# Patient Record
Sex: MALE | Race: WHITE | NOT HISPANIC OR LATINO | Employment: FULL TIME | URBAN - METROPOLITAN AREA
[De-identification: names, ages, dates, MRNs, and addresses within clinical notes are randomized per-mention and may not be internally consistent; named-entity substitution may affect disease eponyms.]

---

## 2019-05-22 ENCOUNTER — TRANSCRIBE ORDERS (OUTPATIENT)
Dept: ADMINISTRATIVE | Facility: HOSPITAL | Age: 29
End: 2019-05-22

## 2019-05-22 DIAGNOSIS — M54.5 LOW BACK PAIN, UNSPECIFIED BACK PAIN LATERALITY, UNSPECIFIED CHRONICITY, WITH SCIATICA PRESENCE UNSPECIFIED: Primary | ICD-10-CM

## 2019-05-28 ENCOUNTER — APPOINTMENT (OUTPATIENT)
Dept: RADIOLOGY | Facility: CLINIC | Age: 29
End: 2019-05-28
Payer: COMMERCIAL

## 2019-05-28 ENCOUNTER — HOSPITAL ENCOUNTER (OUTPATIENT)
Dept: CT IMAGING | Facility: CLINIC | Age: 29
Discharge: HOME | End: 2019-05-28
Payer: COMMERCIAL

## 2019-05-28 DIAGNOSIS — M54.5 LOW BACK PAIN, UNSPECIFIED BACK PAIN LATERALITY, UNSPECIFIED CHRONICITY, WITH SCIATICA PRESENCE UNSPECIFIED: ICD-10-CM

## 2019-05-28 PROCEDURE — 72131 CT LUMBAR SPINE W/O DYE: CPT

## 2019-05-28 PROCEDURE — 72114 X-RAY EXAM L-S SPINE BENDING: CPT

## 2022-06-21 ENCOUNTER — APPOINTMENT (OUTPATIENT)
Dept: LAB | Facility: CLINIC | Age: 32
End: 2022-06-21
Payer: COMMERCIAL

## 2022-06-21 ENCOUNTER — OFFICE VISIT (OUTPATIENT)
Dept: INTERNAL MEDICINE CLINIC | Facility: CLINIC | Age: 32
End: 2022-06-21
Payer: COMMERCIAL

## 2022-06-21 VITALS
DIASTOLIC BLOOD PRESSURE: 70 MMHG | RESPIRATION RATE: 18 BRPM | TEMPERATURE: 97 F | HEART RATE: 91 BPM | OXYGEN SATURATION: 98 % | WEIGHT: 183.2 LBS | SYSTOLIC BLOOD PRESSURE: 112 MMHG

## 2022-06-21 DIAGNOSIS — Z00.00 ANNUAL PHYSICAL EXAM: ICD-10-CM

## 2022-06-21 DIAGNOSIS — Z11.4 SCREENING FOR HIV (HUMAN IMMUNODEFICIENCY VIRUS): ICD-10-CM

## 2022-06-21 DIAGNOSIS — R21 RASH AND NONSPECIFIC SKIN ERUPTION: ICD-10-CM

## 2022-06-21 DIAGNOSIS — Z11.59 NEED FOR HEPATITIS C SCREENING TEST: ICD-10-CM

## 2022-06-21 DIAGNOSIS — Z00.00 ANNUAL PHYSICAL EXAM: Primary | ICD-10-CM

## 2022-06-21 LAB
ALBUMIN SERPL BCP-MCNC: 4.3 G/DL (ref 3.5–5)
ALP SERPL-CCNC: 77 U/L (ref 46–116)
ALT SERPL W P-5'-P-CCNC: 38 U/L (ref 12–78)
ANION GAP SERPL CALCULATED.3IONS-SCNC: 3 MMOL/L (ref 4–13)
AST SERPL W P-5'-P-CCNC: 24 U/L (ref 5–45)
BILIRUB SERPL-MCNC: 0.37 MG/DL (ref 0.2–1)
BUN SERPL-MCNC: 17 MG/DL (ref 5–25)
CALCIUM SERPL-MCNC: 9.2 MG/DL (ref 8.3–10.1)
CHLORIDE SERPL-SCNC: 108 MMOL/L (ref 100–108)
CO2 SERPL-SCNC: 29 MMOL/L (ref 21–32)
CREAT SERPL-MCNC: 1.02 MG/DL (ref 0.6–1.3)
GFR SERPL CREATININE-BSD FRML MDRD: 98 ML/MIN/1.73SQ M
GLUCOSE P FAST SERPL-MCNC: 107 MG/DL (ref 65–99)
POTASSIUM SERPL-SCNC: 3.6 MMOL/L (ref 3.5–5.3)
PROT SERPL-MCNC: 7.9 G/DL (ref 6.4–8.2)
SODIUM SERPL-SCNC: 140 MMOL/L (ref 136–145)

## 2022-06-21 PROCEDURE — 87389 HIV-1 AG W/HIV-1&-2 AB AG IA: CPT

## 2022-06-21 PROCEDURE — 86803 HEPATITIS C AB TEST: CPT

## 2022-06-21 PROCEDURE — 99385 PREV VISIT NEW AGE 18-39: CPT | Performed by: FAMILY MEDICINE

## 2022-06-21 PROCEDURE — 4004F PT TOBACCO SCREEN RCVD TLK: CPT | Performed by: FAMILY MEDICINE

## 2022-06-21 PROCEDURE — 80053 COMPREHEN METABOLIC PANEL: CPT

## 2022-06-21 PROCEDURE — 99406 BEHAV CHNG SMOKING 3-10 MIN: CPT | Performed by: FAMILY MEDICINE

## 2022-06-21 PROCEDURE — 36415 COLL VENOUS BLD VENIPUNCTURE: CPT

## 2022-06-21 PROCEDURE — 3725F SCREEN DEPRESSION PERFORMED: CPT | Performed by: FAMILY MEDICINE

## 2022-06-21 RX ORDER — KETOCONAZOLE 20 MG/G
CREAM TOPICAL DAILY
Qty: 30 G | Refills: 0 | Status: SHIPPED | OUTPATIENT
Start: 2022-06-21

## 2022-06-21 NOTE — PROGRESS NOTES
ADULT ANNUAL PHYSICAL  [address]    NAME: Janina Tang  AGE: 29 y o  SEX: male  : 1990     DATE: 2022     Assessment and Plan:     Problem List Items Addressed This Visit    None     Visit Diagnoses     Annual physical exam    -  Primary    Relevant Orders    Comprehensive metabolic panel    Rash and nonspecific skin eruption        Relevant Medications    ketoconazole (NIZORAL) 2 % cream    Need for hepatitis C screening test        Relevant Orders    Hepatitis C Antibody ([facility])    Screening for HIV (human immunodeficiency virus)        Relevant Orders    HIV 1/2 Antigen/Antibody (4th Generation) w Reflex [facility]        Probable tinea  Antifungal provided  Screening studies ordered  Immunizations and preventive care screenings were discussed with patient today  Appropriate education was printed on patient's after visit summary  Counseling:  · Dental Health: discussed importance of regular tooth brushing, flossing, and dental visits  Depression Screening and Follow-up Plan: Patient was screened for depression during today's encounter  They screened negative with a PHQ-2 score of 0  Tobacco Cessation Counseling: Tobacco cessation counseling was provided  The patient is sincerely urged to quit consumption of tobacco  He is ready to quit tobacco        Return in about 1 year (around 2023) for Annual physical      Chief Complaint:     Chief Complaint   Patient presents with    Physical Exam     Pt states that he thinks that he has a covid non itchy rash on his chest and back he did had covid 3 weeks ago       History of Present Illness:     Adult Annual Physical   Patient here for a comprehensive physical exam    Rash since covid 2 years ago on anterior chest wall  Not itchy  It seemed to get bigger after recent re- infection of covid 3 weeks ago  Similar rash on his low back as well   Tried apple cider vinegar and himalyan salt on the lesions without improvement  [place] and it seemed to get lighter after being outside  It fluctuates in intentisty of redness  Reports hx of kidney stones as a child  Family hx of afib(brother)  Denies family hx of colon or prostatn cancer   Current daily smoker-hx of vape  since 17year old  Hx of tobacco dip use as a teen  Diet and Physical Activity  · Diet/Nutrition: limited fruits/vegetables  · Exercise: 5-7 times a week on average  Depression Screening  PHQ-2/9 Depression Screening    Little interest or pleasure in doing things: 0 - not at all  Feeling down, depressed, or hopeless: 0 - not at all  PHQ-2 Score: 0  PHQ-2 Interpretation: Negative depression screen       General Health  · Sleep: gets 7-8 hours of sleep on average  · Hearing: normal - bilateral   · Vision: most recent eye exam >1 year ago and wears glasses  · Dental: regular dental visits   Health   · Sexual active female partner only    · History of STDs?: no   · hiv screen-years ago  Review of Systems:     Review of Systems   Constitutional: Negative for fatigue and fever  Respiratory: Negative for cough and shortness of breath  Cardiovascular: Negative for chest pain  Gastrointestinal: Negative for blood in stool, constipation and diarrhea  Genitourinary: Negative for penile pain and penile swelling  Musculoskeletal: Positive for back pain  Negative for gait problem  Past Medical History:     History reviewed  No pertinent past medical history     Past Surgical History:     Past Surgical History:   Procedure Laterality Date    BACK SURGERY  04/2018    HAND SURGERY        Social History:     Social History     Socioeconomic History    Marital status: /Civil Union     Spouse name: None    Number of children: None    Years of education: None    Highest education level: None   Occupational History    None   Tobacco Use    Smoking status: Current Every Day Smoker     Packs/day: 0 50     Years: 15 00     Pack years: 7 50     Types: Cigarettes    Smokeless tobacco: Former User     Types: Chew, Snuff     Quit date: 3/1/2011   Substance and Sexual Activity    Alcohol use: Never    Drug use: Yes     Types: Other     Comment: medical marijuana ahs the licensed    Sexual activity: None   Other Topics Concern    None   Social History Narrative    None     Social Determinants of Health     Financial Resource Strain: Not on file   Food Insecurity: Not on file   Transportation Needs: Not on file   Physical Activity: Not on file   Stress: Not on file   Social Connections: Not on file   Intimate Partner Violence: Not on file   Housing Stability: Not on file      Family History:     History reviewed  No pertinent family history  Current Medications:     Current Outpatient Medications   Medication Sig Dispense Refill    ketoconazole (NIZORAL) 2 % cream Apply topically daily 30 g 0     No current facility-administered medications for this visit  Allergies:     No Known Allergies   Physical Exam:     /70 (BP Location: Left arm, Patient Position: Sitting, Cuff Size: Standard)   Pulse 91   Temp (!) 97 °F (36 1 °C) (Temporal)   Resp 18   Wt 83 1 kg (183 lb 3 2 oz)   SpO2 98%     Physical Exam  Constitutional:       Appearance: He is not ill-appearing  HENT:      Head: Normocephalic and atraumatic  Right Ear: Tympanic membrane and external ear normal       Left Ear: Tympanic membrane and external ear normal    Cardiovascular:      Rate and Rhythm: Normal rate and regular rhythm  Heart sounds: No murmur heard  Pulmonary:      Effort: Pulmonary effort is normal       Breath sounds: Normal breath sounds  Abdominal:      General: Abdomen is flat  Bowel sounds are normal       Palpations: Abdomen is soft  Skin:     Findings: Rash present  Comments:  Well demarcated slightly pigmented macular lesions(close to flesh colored)  Neurological:      Mental Status: He is alert and oriented to person, place, and time        Gait: Gait normal               [address] [place], [place],    MEDICAL [address]

## 2022-06-21 NOTE — PATIENT INSTRUCTIONS

## 2022-06-22 LAB — HCV AB SER QL: NORMAL

## 2022-06-23 ENCOUNTER — TELEPHONE (OUTPATIENT)
Dept: INTERNAL MEDICINE CLINIC | Facility: CLINIC | Age: 32
End: 2022-06-23

## 2022-06-23 LAB — HIV 1+2 AB+HIV1 P24 AG SERPL QL IA: NORMAL

## 2022-06-23 NOTE — TELEPHONE ENCOUNTER
----- Message from Carlos Alberto Dent DO sent at 6/23/2022 12:44 PM EDT -----  Please notify pt that his HIV screen was negative

## 2022-06-23 NOTE — TELEPHONE ENCOUNTER
LVM on patient's cell phone  Re:  HIV results  Only lab result of "negative" given, no mention of type/name of test    Asked pt to return call w/questions

## 2022-06-23 NOTE — TELEPHONE ENCOUNTER
----- Message from Moises Gilbert DO sent at 6/23/2022  8:25 AM EDT -----  Please notify pt that overall his blood work looks ok  His HIV test is still pending

## 2023-07-17 ENCOUNTER — APPOINTMENT (EMERGENCY)
Dept: CT IMAGING | Facility: HOSPITAL | Age: 33
End: 2023-07-17
Payer: COMMERCIAL

## 2023-07-17 ENCOUNTER — HOSPITAL ENCOUNTER (EMERGENCY)
Facility: HOSPITAL | Age: 33
Discharge: HOME | End: 2023-07-17
Attending: EMERGENCY MEDICINE | Admitting: EMERGENCY MEDICINE
Payer: COMMERCIAL

## 2023-07-17 VITALS
DIASTOLIC BLOOD PRESSURE: 56 MMHG | RESPIRATION RATE: 16 BRPM | TEMPERATURE: 98 F | OXYGEN SATURATION: 97 % | SYSTOLIC BLOOD PRESSURE: 104 MMHG | HEART RATE: 54 BPM

## 2023-07-17 DIAGNOSIS — M51.16 LUMBAR DISC HERNIATION WITH RADICULOPATHY: Primary | ICD-10-CM

## 2023-07-17 DIAGNOSIS — M54.16 ACUTE RIGHT LUMBAR RADICULOPATHY: ICD-10-CM

## 2023-07-17 LAB
BACTERIA UR QL AUTO: ABNORMAL /HPF
BILIRUB UR QL STRIP: NEGATIVE
CLARITY UR: ABNORMAL
COLOR UR: YELLOW
GLUCOSE UR STRIP-MCNC: NEGATIVE MG/DL
HGB UR QL STRIP.AUTO: NEGATIVE
KETONES UR STRIP-MCNC: NEGATIVE MG/DL
LEUKOCYTE ESTERASE UR QL STRIP: NEGATIVE
MUCOUS THREADS UR QL AUTO: ABNORMAL
NITRITE UR QL STRIP: NEGATIVE
NON-SQ EPI CELLS URNS QL MICRO: ABNORMAL /HPF
PH UR STRIP.AUTO: 6 [PH]
PROT UR STRIP-MCNC: ABNORMAL MG/DL
RBC #/AREA URNS AUTO: ABNORMAL /HPF
SP GR UR STRIP.AUTO: 1.03 (ref 1–1.03)
UROBILINOGEN UR STRIP-ACNC: <2 MG/DL
WBC #/AREA URNS AUTO: ABNORMAL /HPF

## 2023-07-17 PROCEDURE — G1004 CDSM NDSC: HCPCS

## 2023-07-17 PROCEDURE — 72131 CT LUMBAR SPINE W/O DYE: CPT

## 2023-07-17 PROCEDURE — 81001 URINALYSIS AUTO W/SCOPE: CPT | Performed by: EMERGENCY MEDICINE

## 2023-07-17 PROCEDURE — 99284 EMERGENCY DEPT VISIT MOD MDM: CPT | Performed by: EMERGENCY MEDICINE

## 2023-07-17 PROCEDURE — 96372 THER/PROPH/DIAG INJ SC/IM: CPT

## 2023-07-17 PROCEDURE — 99283 EMERGENCY DEPT VISIT LOW MDM: CPT

## 2023-07-17 RX ORDER — LIDOCAINE 50 MG/G
1 PATCH TOPICAL ONCE
Status: DISCONTINUED | OUTPATIENT
Start: 2023-07-17 | End: 2023-07-17 | Stop reason: HOSPADM

## 2023-07-17 RX ORDER — METHYLPREDNISOLONE 4 MG/1
TABLET ORAL
Qty: 21 TABLET | Refills: 0 | Status: SHIPPED | OUTPATIENT
Start: 2023-07-17

## 2023-07-17 RX ORDER — ACETAMINOPHEN 325 MG/1
975 TABLET ORAL ONCE
Status: COMPLETED | OUTPATIENT
Start: 2023-07-17 | End: 2023-07-17

## 2023-07-17 RX ORDER — MORPHINE SULFATE 15 MG/1
15 TABLET ORAL EVERY 6 HOURS PRN
Qty: 10 TABLET | Refills: 0 | Status: SHIPPED | OUTPATIENT
Start: 2023-07-17

## 2023-07-17 RX ORDER — MORPHINE SULFATE 10 MG/ML
6 INJECTION, SOLUTION INTRAMUSCULAR; INTRAVENOUS ONCE
Status: COMPLETED | OUTPATIENT
Start: 2023-07-17 | End: 2023-07-17

## 2023-07-17 RX ORDER — CELECOXIB 200 MG/1
200 CAPSULE ORAL EVERY 12 HOURS
Qty: 60 CAPSULE | Refills: 0 | Status: SHIPPED | OUTPATIENT
Start: 2023-07-17

## 2023-07-17 RX ORDER — KETOROLAC TROMETHAMINE 30 MG/ML
30 INJECTION, SOLUTION INTRAMUSCULAR; INTRAVENOUS ONCE
Status: COMPLETED | OUTPATIENT
Start: 2023-07-17 | End: 2023-07-17

## 2023-07-17 RX ADMIN — MORPHINE SULFATE 6 MG: 10 INJECTION INTRAVENOUS at 08:25

## 2023-07-17 RX ADMIN — KETOROLAC TROMETHAMINE 30 MG: 30 INJECTION, SOLUTION INTRAMUSCULAR at 08:25

## 2023-07-17 RX ADMIN — LIDOCAINE 5% 1 PATCH: 700 PATCH TOPICAL at 08:25

## 2023-07-17 RX ADMIN — ACETAMINOPHEN 975 MG: 325 TABLET, FILM COATED ORAL at 08:25

## 2023-07-17 NOTE — ED PROVIDER NOTES
History  Chief Complaint   Patient presents with   • Back Pain     Severe back pain radiating down R leg, seen yesterday, had trouble urinating last night; hx of repaired herniated disc      Patient is a 21-year-old male with past medical and surgical history significant for prior lumbar disc herniation status post lumbar spine surgery in 2017 in [facility] presents to the emergency department for acute low back pain that started yesterday when he lifted his 60 pound dog. Patient states when he was standing up after lifting the dog he felt acute pain in his right low back which is now mostly in the right buttocks. The pain travels down the entire leg to the level of the foot. He denies any significant chronic back pain since his surgery. He reports he has had constant paresthesia of the right foot as well as subjective feeling of right leg weakness since the pain started. He went to [facility] yesterday who prescribed him Percocet, prednisone 60 mg daily for total of 5 days as well as Valium for muscle relaxer effects. Patient states he took 2 Percocet, 1 Valium as well as the prednisone earlier this morning without relief. He reports that laying on his stomach in a flat position is the only position of comfort. Patient also states that last night he was having difficulty urinating but is unsure if that is related to the position he needed to be in to urinate. This morning he was still able to urinate and denies any feeling of urinary retention and denies any urinary incontinence. He denies any other associated symptoms such as fevers or chills, headache, dizziness or near syncope, cough, URI symptoms, chest pain, shortness of breath, palpitations, abdominal pain, nausea, vomiting, change in bowel habits, dysuria, gross hematuria, flank pain, skin rash or color change, other focal neurologic deficits.       History provided by:  Patient   used: No    Back Pain  Associated symptoms: numbness and weakness    Associated symptoms: no abdominal pain, no chest pain, no dysuria, no fever and no headaches        Prior to Admission Medications   Prescriptions Last Dose Informant Patient Reported? Taking?   ketoconazole (NIZORAL) 2 % cream   No No   Sig: Apply topically daily      Facility-Administered Medications: None       No past medical history on file. Past Surgical History:   Procedure Laterality Date   • BACK SURGERY  04/2018   • HAND SURGERY         No family history on file. I have reviewed and agree with the history as documented. E-Cigarette/Vaping     E-Cigarette/Vaping Substances     Social History     Tobacco Use   • Smoking status: Every Day     Packs/day: 0.50     Years: 15.00     Total pack years: 7.50     Types: Cigarettes   • Smokeless tobacco: Former     Types: Chew, Snuff     Quit date: 3/1/2011   Substance Use Topics   • Alcohol use: Never   • Drug use: Yes     Types: Other     Comment: medical marijuana ahs the licensed       Review of Systems   Constitutional: Negative for chills and fever. HENT: Negative for congestion, ear pain, rhinorrhea and sore throat. Respiratory: Negative for cough, chest tightness, shortness of breath and wheezing. Cardiovascular: Negative for chest pain and palpitations. Gastrointestinal: Negative for abdominal pain, constipation, diarrhea, nausea and vomiting. Genitourinary: Positive for difficulty urinating. Negative for dysuria, flank pain, frequency and hematuria. Musculoskeletal: Positive for back pain. Negative for neck pain and neck stiffness. Skin: Negative for color change, pallor, rash and wound. Allergic/Immunologic: Negative for immunocompromised state. Neurological: Positive for weakness and numbness. Negative for dizziness, syncope, facial asymmetry, speech difficulty, light-headedness and headaches. Hematological: Negative for adenopathy. Does not bruise/bleed easily.    Psychiatric/Behavioral: Negative for confusion and decreased concentration. All other systems reviewed and are negative. Physical Exam  Physical Exam  Vitals and nursing note reviewed. Constitutional:       General: He is not in acute distress. Appearance: Normal appearance. He is well-developed. He is not ill-appearing, toxic-appearing or diaphoretic. HENT:      Head: Normocephalic and atraumatic. Right Ear: External ear normal.      Left Ear: External ear normal.      Nose: Nose normal.      Mouth/Throat:      Mouth: Mucous membranes are moist.      Pharynx: Oropharynx is clear. Eyes:      Extraocular Movements: Extraocular movements intact. Conjunctiva/sclera: Conjunctivae normal.   Neck:      Vascular: No JVD. Cardiovascular:      Rate and Rhythm: Normal rate and regular rhythm. Pulses: Normal pulses. Heart sounds: Normal heart sounds. No murmur heard. No friction rub. No gallop. Pulmonary:      Effort: Pulmonary effort is normal. No respiratory distress. Breath sounds: Normal breath sounds. No wheezing, rhonchi or rales. Abdominal:      General: There is no distension. Palpations: Abdomen is soft. Tenderness: There is no abdominal tenderness. There is no guarding or rebound. Musculoskeletal:         General: Tenderness present. No swelling or deformity. Cervical back: Normal range of motion and neck supple. No rigidity. Comments: Mild tenderness in the right gluteus muscle. No reproducible midline thoracolumbar spine tenderness. Skin:     General: Skin is warm and dry. Coloration: Skin is not pale. Findings: No erythema or rash. Neurological:      General: No focal deficit present. Mental Status: He is alert and oriented to person, place, and time. Sensory: No sensory deficit. Motor: Weakness present. Comments: 4/5 strength in bilateral lower extremities both with hip flexors as well as with dorsiflexion/plantarflexion.   No gross sensory deficits.    Psychiatric:         Mood and Affect: Mood normal.         Behavior: Behavior normal.         Vital Signs  ED Triage Vitals   Temperature Pulse Respirations Blood Pressure SpO2   07/17/23 0738 07/17/23 0738 07/17/23 0738 07/17/23 0738 07/17/23 0738   98 °F (36.7 °C) 64 16 130/64 98 %      Temp src Heart Rate Source Patient Position - Orthostatic VS BP Location FiO2 (%)   -- 07/17/23 1139 07/17/23 1139 07/17/23 1139 --    Monitor Lying Left arm       Pain Score       07/17/23 1142       2         Vitals:    07/17/23 0738 07/17/23 1139   BP: 130/64 104/56   BP Location:  Left arm   Pulse: 64 (!) 54   Resp: 16 16   Temp: 98 °F (36.7 °C)    SpO2: 98% 97%       Visual Acuity  Visual Acuity    Flowsheet Row Most Recent Value   L Pupil Size (mm) 3   R Pupil Size (mm) 3          ED Medications  Medications   lidocaine (LIDODERM) 5 % patch 1 patch (1 patch Topical Medication Applied 7/17/23 0825)   ketorolac (TORADOL) injection 30 mg (30 mg Intramuscular Given 7/17/23 0825)   acetaminophen (TYLENOL) tablet 975 mg (975 mg Oral Given 7/17/23 0825)   morphine injection 6 mg (6 mg Intramuscular Given 7/17/23 0825)       Diagnostic Studies  Results Reviewed     Procedure Component Value Units Date/Time    Urine Microscopic [[id number]]  (Abnormal) Collected: 07/17/23 1138    Lab Status: Final result Specimen: Urine, Other Updated: 07/17/23 1149     RBC, UA 1-2 /hpf      WBC, UA 1-2 /hpf      Epithelial Cells Occasional /hpf      Bacteria, UA Occasional /hpf      MUCUS THREADS Innumerable    UA (URINE) with reflex to Scope [[id number]]  (Abnormal) Collected: 07/17/23 1138    Lab Status: Final result Specimen: Urine, Other Updated: 07/17/23 1146     Color, UA Yellow     Clarity, UA Turbid     Specific Gravity, UA 1.026     pH, UA 6.0     Leukocytes, UA Negative     Nitrite, UA Negative     Protein, UA Trace mg/dl      Glucose, UA Negative mg/dl      Ketones, UA Negative mg/dl      Urobilinogen, UA <2.0 mg/dl Bilirubin, UA Negative     Occult Blood, UA Negative                 CT spine lumbar without contrast   Final Result by Patricia Patel MD (07/17 0842)      Interval development of a right paracentral disc extrusion at L4-L5 effacing the right lateral recess for the descending L5 nerve root. Findings are new since 5/28/2019. Workstation performed: [id number]                    Procedures  Procedures         ED Course  ED Course as of 07/17/23 1326   Mon Jul 17, 2023   1015 The patient and wife about significant CT findings of acute right paracentral disc herniation/extrusion at L4 and L5 which explains his right-sided radicular symptoms. Patient feeling somewhat better after the Toradol and morphine. Advised him that he will need to be able to ambulate and urinate prior to discharge. Patient is going to try to drink fluids and then attempt to urinate. I did offer an LSO brace but patient states he has a back brace at home from his prior lumbar surgery. 1207 Postvoid residual volume 11 cc per nursing. 56 Spoke with patient and wife and he was able to ambulate but only using crutches that he brought with him to the ED because it is far too painful to put any weight on the right leg. Will consult with neurosurgery on-call for further recommendations at this time. 300 [facility] on-call neurosurgery AP, Vidhya Hinojosa. 1300 Neurosurgery said patient can be discharged and does not need admission or transfer. He can follow-up with neurosurgery if no benefit from PT and medication in 1 month. 1316 Updated patient about neurosurgery recommendations. Wife was able to get patient an appointment with [facility] orthopedic surgery this Wednesday, 2 days from now. Discussed symptomatic management at home and I advised that he should not take any of the narcotic prescriptions at the same time as the Valium and to space these medications out.   Although he was just prescribed Percocet, he is not getting any relief from 10 mg of oxycodone so we will prescribe morphine as he got better relief from morphine and advised him to use this for breakthrough pain. We will also provide prescription for Medrol Dosepak to be started after the 5-day prednisone burst that was previously prescribed. We will also prescribe Celebrex to be used every 12 hours. Advised him to take these medications with food and not to drive after taking the Valium or the narcotic pain medication. Discussed ED return parameters including concerning signs or symptoms of spinal cord compression. SBIRT 20yo+    Flowsheet Row Most Recent Value   Initial Alcohol Screen: US AUDIT-C     1. How often do you have a drink containing alcohol? 0 Filed at: 07/17/2023 0739   2. How many drinks containing alcohol do you have on a typical day you are drinking? 0 Filed at: 07/17/2023 0739   3a. Male UNDER 65: How often do you have five or more drinks on one occasion? 0 Filed at: 07/17/2023 0739   3b. FEMALE Any Age, or MALE 65+: How often do you have 4 or more drinks on one occassion? 0 Filed at: 07/17/2023 0739   Audit-C Score 0 Filed at: 07/17/2023 7391   ESTRELLA: How many times in the past year have you. .. Used an illegal drug or used a prescription medication for non-medical reasons? Never Filed at: 07/17/2023 7072                    Medical Decision Making  22-year-old male presents to the ED for acute right lumbar radiculopathy that started yesterday when lifting his dog. Suspect acute disc herniation. Patient does have history of slipped disc with lumbar spine surgery in 2017. Very low suspicion for cauda equina syndrome. Will check urinalysis and postvoid residual volume given that he reports that he had difficulty urinating last night. Patient has mild weakness of both legs but this is likely due to pain and poor effort due to the pain.   He was seen at [facility] yesterday, refused imaging because he wanted an MRI which was not available. He was given prescription for Percocet, Valium, prednisone and took his doses today with minimal relief. Will provide IM Toradol, IM morphine and lidocaine patch. Obtain CT lumbar spine without contrast.  Patient is agreeable to this imaging. I explained to patient that we are unable to get MRI unless it is an emergent condition and pending CT results, no indication for stat MRI at this time. Will likely refer to comprehensive spine program and orthopedic surgery. Patient will need MRI in the near future. Amount and/or Complexity of Data Reviewed  External Data Reviewed:      Details: Reviewed recent ED records from [facility] on 7/16/2023  Labs: ordered. Decision-making details documented in ED Course. Radiology: ordered. Risk  OTC drugs. Prescription drug management. Disposition  Final diagnoses:   Lumbar disc herniation with radiculopathy   Acute right lumbar radiculopathy     Time reflects when diagnosis was documented in both MDM as applicable and the Disposition within this note     Time User Action Codes Description Comment    7/17/2023  1:20 PM Remigio VAZQUEZ Add [E01.44,  M51.26] Degeneration of lumbar intervertebral disc with acute herniation     7/17/2023  1:20 PM Remigio VAZQUEZ Remove [M51.36,  M51.26] Degeneration of lumbar intervertebral disc with acute herniation     7/17/2023  1:21 PM Remigio VAZQUEZ Add [M51.16] Lumbar disc herniation with radiculopathy     7/17/2023  1:21 PM Remigio VAZQUEZ Add [M54.16] Acute right lumbar radiculopathy       ED Disposition     ED Disposition   Discharge    Condition   Stable    Date/Time   Mon Jul 17, 2023  1:20 PM    Comment   Ashli Corcoran discharge to home/self care.                Follow-up Information     Follow up With Specialties Details Why Contact Info Additional Information    Rogers Alejandra,  Family Medicine Schedule an appointment as soon as possible for a visit [address] [facility] 1330 [facility] Orthopedic Surgery Schedule an appointment as soon as possible for a visit   [address] [phone] [facility] [place], [address] [place] [address] [address], [place], [place], [phone]    [address] Neurosurgery Schedule an appointment as soon as possible for a visit   [address] [phone]  1441 [facility], /[address], [place], [place], [address] [phone]    [facility] Emergency Department Emergency Medicine Go to  If symptoms worsen 100 [facility] Emergency Department, [place], [place], [address]          Patient's Medications   Discharge Prescriptions    CELECOXIB (CELEBREX) 200 MG CAPSULE    Take 1 capsule (200 mg total) by mouth every 12 (twelve) hours       Start Date: 7/17/2023 End Date: --       Order Dose: 200 mg       Quantity: 60 capsule    Refills: 0    METHYLPREDNISOLONE 4 MG TABLET THERAPY PACK    Use as directed on package       Start Date: 7/17/2023 End Date: --       Order Dose: --       Quantity: 21 tablet    Refills: 0    MORPHINE (MSIR) 15 MG TABLET    Take 1 tablet (15 mg total) by mouth every 6 (six) hours as needed for severe pain Max Daily Amount: 60 mg       Start Date: 7/17/2023 End Date: --       Order Dose: 15 mg       Quantity: 10 tablet    Refills: 0           PDMP Review     None          ED Provider  Electronically Signed by           Ary Blake DO  07/17/23 6199

## 2023-07-18 ENCOUNTER — TELEPHONE (OUTPATIENT)
Dept: PHYSICAL THERAPY | Facility: OTHER | Age: 33
End: 2023-07-18

## 2023-07-18 NOTE — TELEPHONE ENCOUNTER
Call placed to the patient per Comprehensive Spine Program referral.    Spoke with patient, explained csp and reason for the call. Patient states he has a referral for [facility], they called him yesterday for the triage and will call him back in a week to schedule an eval.    Patient states he would like to have the eval first with [facility] and then he will give us a call if they thing PT will be the right option for him at this time. Will close referral per protocol.

## 2023-08-01 ENCOUNTER — TELEPHONE (OUTPATIENT)
Dept: NEUROSURGERY | Facility: CLINIC | Age: 33
End: 2023-08-01

## 2023-08-01 NOTE — TELEPHONE ENCOUNTER
Patient canceled appt. He stated that he's not coming in for 2nd opinion and will f/u in [address] [place] [address] with his original surgeon.    Appt for 8/3/23 rimma/ Bernice Abbott is canceled

## 2023-08-02 ENCOUNTER — TELEPHONE (OUTPATIENT)
Age: 33
End: 2023-08-02

## 2023-08-15 ENCOUNTER — TELEPHONE (OUTPATIENT)
Age: 33
End: 2023-08-15

## 2023-08-15 NOTE — TELEPHONE ENCOUNTER
Patient was scheduled for a clearance appointment with [address] The MA called him back and put him in a room. She went to take his BP and he refused. The MA did not feel comfortable so called another MA in the room. He again refused telling the MA to just sign the paperwork. There were several non acceptable swear words attached. The MA left the room and came to tell me. I spoke to the patient and told him we have to do BP and exam him in order to clear the patient. He began to yell  and scream that we did not need to do anything to him just sign the paper. with again some not so nice words. I again explained we need to clear him for surgery and that we need to examine since we have not seen him since 2022 . He refused so I told him we would cancel the appointment and refund his money. He said fine and preceded to follow me to the reception area to get his co pay back . He continued the whole time swearing and screaming. The person that was with him tried to calm him down as well which did not work. We refunded his money and left him go. I called Ed the  at [address] [place] [address]  and explained how patient refused to get examined and we would not be clearing him. He said he would speak to Dr Charlene Malagon and patient . I explained we would give Jordon Salgado another chance if they could make him understand we need to do exam as it has been a year.

## 2024-12-13 ENCOUNTER — OFFICE VISIT (OUTPATIENT)
Age: 34
End: 2024-12-13
Payer: COMMERCIAL

## 2024-12-13 ENCOUNTER — HOSPITAL ENCOUNTER (EMERGENCY)
Facility: HOSPITAL | Age: 34
Discharge: HOME | End: 2024-12-13
Attending: EMERGENCY MEDICINE
Payer: COMMERCIAL

## 2024-12-13 VITALS
TEMPERATURE: 98 F | HEART RATE: 74 BPM | HEIGHT: 69 IN | WEIGHT: 184.08 LBS | BODY MASS INDEX: 27.27 KG/M2 | OXYGEN SATURATION: 100 % | DIASTOLIC BLOOD PRESSURE: 68 MMHG | RESPIRATION RATE: 18 BRPM | SYSTOLIC BLOOD PRESSURE: 130 MMHG

## 2024-12-13 VITALS
RESPIRATION RATE: 18 BRPM | OXYGEN SATURATION: 99 % | HEART RATE: 75 BPM | BODY MASS INDEX: 27.17 KG/M2 | SYSTOLIC BLOOD PRESSURE: 128 MMHG | TEMPERATURE: 98 F | WEIGHT: 184 LBS | DIASTOLIC BLOOD PRESSURE: 71 MMHG

## 2024-12-13 DIAGNOSIS — S05.91XA RIGHT EYE INJURY, INITIAL ENCOUNTER: Primary | ICD-10-CM

## 2024-12-13 DIAGNOSIS — T15.91XA FOREIGN BODY OF RIGHT EYE, INITIAL ENCOUNTER: Primary | ICD-10-CM

## 2024-12-13 PROCEDURE — G0383 LEV 4 HOSP TYPE B ED VISIT: HCPCS | Performed by: PHYSICIAN ASSISTANT

## 2024-12-13 PROCEDURE — 99284 EMERGENCY DEPT VISIT MOD MDM: CPT | Performed by: EMERGENCY MEDICINE

## 2024-12-13 PROCEDURE — 65205 REMOVE FOREIGN BODY FROM EYE: CPT | Performed by: PHYSICIAN ASSISTANT

## 2024-12-13 PROCEDURE — S9083 URGENT CARE CENTER GLOBAL: HCPCS | Performed by: PHYSICIAN ASSISTANT

## 2024-12-13 PROCEDURE — 99283 EMERGENCY DEPT VISIT LOW MDM: CPT

## 2024-12-13 RX ORDER — TETRACAINE HYDROCHLORIDE 5 MG/ML
1 SOLUTION OPHTHALMIC ONCE
Status: COMPLETED | OUTPATIENT
Start: 2024-12-13 | End: 2024-12-13

## 2024-12-13 RX ORDER — OFLOXACIN 3 MG/ML
1 SOLUTION/ DROPS OPHTHALMIC 4 TIMES DAILY
Qty: 5 ML | Refills: 0 | Status: SHIPPED | OUTPATIENT
Start: 2024-12-13

## 2024-12-13 RX ADMIN — FLUORESCEIN SODIUM 1 STRIP: 1 STRIP OPHTHALMIC at 17:42

## 2024-12-13 RX ADMIN — TETRACAINE HYDROCHLORIDE 1 DROP: 5 SOLUTION OPHTHALMIC at 17:41

## 2024-12-13 NOTE — PATIENT INSTRUCTIONS
Foreign body right eye  Ocuflox as directed  Referred to the emergency room for further evaluation  Follow up with PCP in 3-5 days.  Proceed to  ER if symptoms worsen.

## 2024-12-13 NOTE — PROGRESS NOTES
[facility]        NAME: Keith Andino is a 34 y.o. male  : 1990    MRN: [id number]  DATE: 2024  TIME: 4:09 PM    Assessment and Plan   Foreign body of right eye, initial encounter [T15.91XA]  1. Foreign body of right eye, initial encounter  ofloxacin (OCUFLOX) 0.3 % ophthalmic solution            Patient Instructions     Foreign body right eye  Ocuflox as directed  Referred to the emergency room for further evaluation  Follow up with PCP in 3-5 days.  Proceed to  ER if symptoms worsen.    Chief Complaint     Chief Complaint   Patient presents with    Eye Problem     Right eye with possible metal in eye. Denies pain. This am he was fixing the exhaust fan and a felt something go in eye and now see the piece in the eye.         History of Present Illness       34-year-old male who presents complaining of foreign body sensation to the right eye.  Patient states that he feels like metal 1 and while he was cleaning a fan.  Denies visual disturbances.    Eye Problem   Pertinent negatives include no eye discharge, eye redness, itching or photophobia.       Review of Systems   Review of Systems   Constitutional: Negative.    HENT: Negative.     Eyes:  Positive for pain. Negative for photophobia, discharge, redness, itching and visual disturbance.   Respiratory: Negative.  Negative for apnea, cough, choking, chest tightness, shortness of breath, wheezing and stridor.    Cardiovascular: Negative.  Negative for chest pain.         Current Medications       Current Outpatient Medications:     ofloxacin (OCUFLOX) 0.3 % ophthalmic solution, Administer 1 drop to the right eye 4 (four) times a day, Disp: 5 mL, Rfl: 0    celecoxib (CeleBREX) 200 mg capsule, Take 1 capsule (200 mg total) by mouth every 12 (twelve) hours (Patient not taking: Reported on 2024), Disp: 60 capsule, Rfl: 0    ketoconazole (NIZORAL) 2 % cream, Apply topically daily (Patient not taking: Reported on 2024), Disp: 30  g, Rfl: 0    methylPREDNISolone 4 MG tablet therapy pack, Use as directed on package (Patient not taking: Reported on 12/13/2024), Disp: 21 tablet, Rfl: 0    morphine (MSIR) 15 mg tablet, Take 1 tablet (15 mg total) by mouth every 6 (six) hours as needed for severe pain Max Daily Amount: 60 mg (Patient not taking: Reported on 12/13/2024), Disp: 10 tablet, Rfl: 0    pregabalin (LYRICA) 75 mg capsule, Take 75 mg by mouth 3 (three) times a day (Patient not taking: Reported on 12/13/2024), Disp: , Rfl:     Current Allergies     Allergies as of 12/13/2024    (No Known Allergies)            The following portions of the patient's history were reviewed and updated as appropriate: allergies, current medications, past family history, past medical history, past social history, past surgical history and problem list.     History reviewed. No pertinent past medical history.    Past Surgical History:   Procedure Laterality Date    BACK SURGERY  04/2018    HAND SURGERY         History reviewed. No pertinent family history.      Medications have been verified.        Objective   /71 (Patient Position: Sitting, Cuff Size: Adult)   Pulse 75   Temp 98 °F (36.7 °C) (Skin)   Resp 18   Wt 83.5 kg (184 lb)   SpO2 99%   BMI 27.17 kg/m²        Physical Exam     Physical Exam  Constitutional:       General: He is not in acute distress.     Appearance: Normal appearance. He is well-developed. He is not diaphoretic.   HENT:      Head: Normocephalic and atraumatic.   Eyes:      General: Lids are normal. Vision grossly intact. Gaze aligned appropriately. No allergic shiner, visual field deficit or scleral icterus.        Right eye: Foreign body present. No discharge or hordeolum.         Left eye: No foreign body, discharge or hordeolum.     Cardiovascular:      Rate and Rhythm: Normal rate and regular rhythm.      Heart sounds: Normal heart sounds.   Pulmonary:      Effort: Pulmonary effort is normal. No respiratory distress.       Breath sounds: Normal breath sounds. No wheezing or rales.   Chest:      Chest wall: No tenderness.   Musculoskeletal:      Cervical back: Normal range of motion and neck supple.   Lymphadenopathy:      Cervical: No cervical adenopathy.   Neurological:      Mental Status: He is alert.     Universal Protocol:  Consent: Verbal consent obtained.  Risks and benefits: risks, benefits and alternatives were discussed  Consent given by: patient  Patient understanding: patient states understanding of the procedure being performed  Foreign body removal    Date/Time: 12/13/2024 3:30 PM    Performed by: Travon Darden PA-C  Authorized by: Travon Darden PA-C  Body area: eye  Location details: right conjunctiva    Anesthesia:  Local Anesthetic: tetracaine drops    Sedation:  Patient sedated: no  Removal mechanism: moist cotton swab  Eye examined with fluorescein.  Fluorescein uptake.  Corneal abrasion size: small  Corneal abrasion location: lateral  Residual rust ring present.  Dressing: antibiotic drops  Depth: embedded  Complexity: complex  1 objects recovered.  Objects recovered: metal spec  Post-procedure assessment: foreign body not removed  Patient tolerance: patient tolerated the procedure well with no immediate complications  Comments: Patient referred to the ER for further eval as foreign body only partially removed

## 2024-12-13 NOTE — ED PROVIDER NOTES
Time reflects when diagnosis was documented in both MDM as applicable and the Disposition within this note       Time User Action Codes Description Comment    12/13/2024  5:32 PM Julianna Oscar Add [S05.91XA] Right eye injury, initial encounter           ED Disposition       ED Disposition   Discharge    Condition   Stable    Date/Time   Fri Dec 13, 2024  5:32 PM    Comment   Keith Andino discharge to home/self care.                   Assessment & Plan       Medical Decision Making  Patient is a 34-year-old male who was referred to the emergency department due to concern for retained metallic foreign body.  No gross foreign body identified on visual inspection. I reexamined with tetracaine and fluorescein.  No dye uptake to suggest retained foreign body or persistent rust ring.  Based on review of the patient's chart from urgent care, may have had a small piece of foreign body, however based on the small black foreign body noted on the patient's eyelid, suspect that it became dislodged between urgent care discharge and ED arrival.  He has no significant corneal abrasion or ulceration noted on tetracaine examination.  No foreign body or rust ring appreciated.    Risk  Prescription drug management.             Medications   tetracaine 0.5 % ophthalmic solution 1 drop (1 drop Right Eye Given 12/13/24 1741)   fluorescein sodium sterile ophthalmic strip 1 strip (1 strip Right Eye Given 12/13/24 1742)       ED Risk Strat Scores                          SBIRT 20yo+      Flowsheet Row Most Recent Value   Initial Alcohol Screen: US AUDIT-C     1. How often do you have a drink containing alcohol? 0 Filed at: 12/13/2024 1659   2. How many drinks containing alcohol do you have on a typical day you are drinking?  0 Filed at: 12/13/2024 1659   3a. Male UNDER 65: How often do you have five or more drinks on one occasion? 0 Filed at: 12/13/2024 1659   Audit-C Score 0 Filed at: 12/13/2024 1659   ESTRELLA: How many times in the  past year have you...    Used an illegal drug or used a prescription medication for non-medical reasons? Never Filed at: 12/13/2024 0454                            History of Present Illness       Chief Complaint   Patient presents with    Eye Injury     Pt got something stuck in his right eye. Pt was seen at urgent care and they were able to removed some of it but not everything. Pt was told to come here for eval        History reviewed. No pertinent past medical history.   Past Surgical History:   Procedure Laterality Date    BACK SURGERY  04/2018    HAND SURGERY        History reviewed. No pertinent family history.   Social History     Tobacco Use    Smoking status: Every Day     Current packs/day: 0.50     Average packs/day: 0.5 packs/day for 15.0 years (7.5 ttl pk-yrs)     Types: Cigarettes    Smokeless tobacco: Former     Types: Chew, Snuff     Quit date: 3/1/2011   Vaping Use    Vaping status: Every Day    Substances: Nicotine, THC, Flavoring   Substance Use Topics    Alcohol use: Never    Drug use: Yes     Types: Other, Marijuana     Comment: medical marijuana ahs the licensed      E-Cigarette/Vaping    E-Cigarette Use Current Every Day User       E-Cigarette/Vaping Substances    Nicotine Yes     THC Yes     CBD No     Flavoring Yes       I have reviewed and agree with the history as documented.     Patient referred from  due to concern for retained eye FB          Review of Systems   Eyes:  Negative for photophobia, pain and redness.           Objective       ED Triage Vitals [12/13/24 1658]   Temperature Pulse Blood Pressure Respirations SpO2 Patient Position - Orthostatic VS   98 °F (36.7 °C) 74 130/68 18 100 % Sitting      Temp Source Heart Rate Source BP Location FiO2 (%) Pain Score    Oral Monitor Left arm -- --      Vitals      Date and Time Temp Pulse SpO2 Resp BP Pain Score FACES Pain Rating User   12/13/24 1658 98 °F (36.7 °C) 74 100 % 18 130/68 -- -- LA            Physical Exam  Eyes:       General: Lids are normal. Vision grossly intact.         Right eye: No foreign body or discharge.      Extraocular Movements: Extraocular movements intact.      Conjunctiva/sclera: Conjunctivae normal.      Pupils: Pupils are equal, round, and reactive to light.      Right eye: No corneal abrasion or fluorescein uptake. Yumiko exam negative.      Slit lamp exam:     Right eye: No corneal ulcer or foreign body.         Results Reviewed       None            No orders to display       Procedures    ED Medication and Procedure Management   Prior to Admission Medications   Prescriptions Last Dose Informant Patient Reported? Taking?   celecoxib (CeleBREX) 200 mg capsule   No No   Sig: Take 1 capsule (200 mg total) by mouth every 12 (twelve) hours   Patient not taking: Reported on 12/13/2024   ketoconazole (NIZORAL) 2 % cream   No No   Sig: Apply topically daily   Patient not taking: Reported on 12/13/2024   methylPREDNISolone 4 MG tablet therapy pack   No No   Sig: Use as directed on package   Patient not taking: Reported on 12/13/2024   morphine (MSIR) 15 mg tablet   No No   Sig: Take 1 tablet (15 mg total) by mouth every 6 (six) hours as needed for severe pain Max Daily Amount: 60 mg   Patient not taking: Reported on 12/13/2024   ofloxacin (OCUFLOX) 0.3 % ophthalmic solution   No No   Sig: Administer 1 drop to the right eye 4 (four) times a day   pregabalin (LYRICA) 75 mg capsule   Yes No   Sig: Take 75 mg by mouth 3 (three) times a day   Patient not taking: Reported on 12/13/2024      Facility-Administered Medications: None     Discharge Medication List as of 12/13/2024  5:34 PM        CONTINUE these medications which have NOT CHANGED    Details   celecoxib (CeleBREX) 200 mg capsule Take 1 capsule (200 mg total) by mouth every 12 (twelve) hours, Starting Mon 7/17/2023, Normal      ketoconazole (NIZORAL) 2 % cream Apply topically daily, Starting Tue 6/21/2022, Normal      methylPREDNISolone 4 MG tablet therapy pack Use  as directed on package, Normal      morphine (MSIR) 15 mg tablet Take 1 tablet (15 mg total) by mouth every 6 (six) hours as needed for severe pain Max Daily Amount: 60 mg, Starting Mon 7/17/2023, Normal      ofloxacin (OCUFLOX) 0.3 % ophthalmic solution Administer 1 drop to the right eye 4 (four) times a day, Starting Fri 12/13/2024, Normal      pregabalin (LYRICA) 75 mg capsule Take 75 mg by mouth 3 (three) times a day, Starting Mon 7/31/2023, Historical Med           No discharge procedures on file.  ED SEPSIS DOCUMENTATION   Time reflects when diagnosis was documented in both MDM as applicable and the Disposition within this note       Time User Action Codes Description Comment    12/13/2024  5:32 PM Julianna Oscar Add [S05.91XA] Right eye injury, initial encounter                  Julianna Oscar MD  12/13/24 4828

## 2024-12-13 NOTE — DISCHARGE INSTRUCTIONS
You were seen and evaluated today for foreign body in eye.  Your test results demonstrated no foreign body detected. No rust ring appreciated.  Continue antibiotic drops as prescribed by urgent care (1 drop in the right eye four times daily).   Please take all medications as instructed. Follow up with your PCP as discussed.   RETURN TO THE EMERGENCY DEPARTMENT if you develop new or worsening symptoms and are unable to see your PCP.

## 2025-03-18 ENCOUNTER — OFFICE VISIT (OUTPATIENT)
Age: 35
End: 2025-03-18
Payer: COMMERCIAL

## 2025-03-18 VITALS
RESPIRATION RATE: 16 BRPM | OXYGEN SATURATION: 97 % | HEART RATE: 89 BPM | SYSTOLIC BLOOD PRESSURE: 117 MMHG | DIASTOLIC BLOOD PRESSURE: 69 MMHG | TEMPERATURE: 98.4 F

## 2025-03-18 DIAGNOSIS — R68.89 FLU-LIKE SYMPTOMS: Primary | ICD-10-CM

## 2025-03-18 PROCEDURE — S9083 URGENT CARE CENTER GLOBAL: HCPCS | Performed by: PHYSICIAN ASSISTANT

## 2025-03-18 PROCEDURE — G0382 LEV 3 HOSP TYPE B ED VISIT: HCPCS | Performed by: PHYSICIAN ASSISTANT

## 2025-03-18 NOTE — PROGRESS NOTES
[facility]        NAME: Keith Andino is a 34 y.o. male  : 1990    MRN: [id number]  DATE: 2025  TIME: 10:00 AM    Assessment and Plan   Flu-like symptoms [R68.89]  1. Flu-like symptoms            Follow-up with PCP if no improvement with over-the-counter symptomatic treatment patient declined any further testing at this time    The patient and/or parent/legal guardian verbalized understanding of exam findings and   Treatment plan. We engaged in the shared decision-making process and treatment options were   discussed at length with the patient.  All questions, concerns and complaints were answered and   addressed to the patient's' and/or parent/legal guardians's satisfaction.    Patient Instructions   There are no Patient Instructions on file for this visit.    Follow up with PCP in 3-5 days.  Proceed to  ER if symptoms worsen.    If tests are performed, our office will contact you with results only if   changes need to made to the care plan discussed with you at the visit.   [facility]     Chief Complaint     Chief Complaint   Patient presents with   • Sore Throat     Patient states he woke up this morning with body aches, sore throat, patient states he has pain when taking a deep breath. Patient states he did a covid test and it was negative.          History of Present Illness       HPI  Patient reports waking up this morning with bodyaches sore throat pain with taking deep breath in the upper chest.  did a home COVID test that was negative. Cold sweats this morning no recorded fever. Coughing up phlegm this morning.     Review of Systems   Review of Systems  All other related systems reviewed with patient or accompanying historian and are negative except as noted in HPI    Current Medications       Current Outpatient Medications:   •  celecoxib (CeleBREX) 200 mg capsule, Take 1 capsule (200 mg total) by mouth every 12 (twelve) hours (Patient  not taking: Reported on 12/13/2024), Disp: 60 capsule, Rfl: 0  •  ketoconazole (NIZORAL) 2 % cream, Apply topically daily (Patient not taking: Reported on 12/13/2024), Disp: 30 g, Rfl: 0  •  methylPREDNISolone 4 MG tablet therapy pack, Use as directed on package (Patient not taking: Reported on 12/13/2024), Disp: 21 tablet, Rfl: 0  •  morphine (MSIR) 15 mg tablet, Take 1 tablet (15 mg total) by mouth every 6 (six) hours as needed for severe pain Max Daily Amount: 60 mg (Patient not taking: Reported on 12/13/2024), Disp: 10 tablet, Rfl: 0  •  ofloxacin (OCUFLOX) 0.3 % ophthalmic solution, Administer 1 drop to the right eye 4 (four) times a day, Disp: 5 mL, Rfl: 0  •  pregabalin (LYRICA) 75 mg capsule, Take 75 mg by mouth 3 (three) times a day (Patient not taking: Reported on 12/13/2024), Disp: , Rfl:     Current Allergies     Allergies as of 03/18/2025   • (No Known Allergies)            The following portions of the patient's history were reviewed and updated as appropriate: allergies, current medications, past family history, past medical history, past social history, past surgical history and problem list.     No past medical history on file.    Past Surgical History:   Procedure Laterality Date   • BACK SURGERY  04/2018   • HAND SURGERY         No family history on file.      Medications have been verified.        Objective   /69   Pulse 89   Temp 98.4 °F (36.9 °C)   Resp 16   SpO2 97%   No LMP for male patient.       Physical Exam     Physical Exam  Constitutional:       General: He is not in acute distress.     Appearance: He is well-developed.   HENT:      Head: Normocephalic and atraumatic.      Mouth/Throat:      Mouth: Mucous membranes are moist.      Pharynx: Uvula midline. No posterior oropharyngeal erythema.      Tonsils: No tonsillar exudate or tonsillar abscesses. 0 on the right. 0 on the left.   Eyes:      General: No scleral icterus.     Conjunctiva/sclera: Conjunctivae normal.  "  Cardiovascular:      Rate and Rhythm: Normal rate and regular rhythm.      Heart sounds: Normal heart sounds. No murmur heard.     Comments: There is tenderness in the intercostal spaces bilaterally in the parasternal regions and superior chest wall  Pulmonary:      Effort: Pulmonary effort is normal. No respiratory distress.      Breath sounds: No stridor. No wheezing, rhonchi or rales.   Musculoskeletal:      Cervical back: Normal range of motion and neck supple.   Skin:     General: Skin is warm and dry.   Neurological:      Mental Status: He is alert and oriented to person, place, and time.   Psychiatric:         Behavior: Behavior normal.         Ortho Exam        Procedures  No Procedures performed today        Note: Portions of this record may have been created with voice recognition software. Occasional wrong word or \"sound a like\" substitutions may have occurred due to the inherent limitations of voice recognition software. Please read the chart carefully and recognize, using context, where substitutions have occurred.*      "

## 2025-03-18 NOTE — LETTER
March 18, 2025     Patient: Keith Andino   YOB: 1990   Date of Visit: 3/18/2025       To Whom It May Concern:    It is my medical opinion that Keith Andino may return to work on 3/19/25 .    If you have any questions or concerns, please don't hesitate to call.         Sincerely,        Reinaldo Crabtree PA-C    CC: No Recipients

## 2025-07-15 ENCOUNTER — OFFICE VISIT (OUTPATIENT)
Age: 35
End: 2025-07-15
Payer: COMMERCIAL

## 2025-07-15 ENCOUNTER — APPOINTMENT (OUTPATIENT)
Age: 35
End: 2025-07-15
Payer: COMMERCIAL

## 2025-07-15 VITALS
TEMPERATURE: 97.3 F | WEIGHT: 187.8 LBS | DIASTOLIC BLOOD PRESSURE: 71 MMHG | RESPIRATION RATE: 18 BRPM | OXYGEN SATURATION: 99 % | SYSTOLIC BLOOD PRESSURE: 110 MMHG | BODY MASS INDEX: 27.73 KG/M2 | HEART RATE: 94 BPM

## 2025-07-15 DIAGNOSIS — R63.0 LOSS OF APPETITE: ICD-10-CM

## 2025-07-15 DIAGNOSIS — R52 GENERALIZED BODY ACHES: ICD-10-CM

## 2025-07-15 DIAGNOSIS — R53.83 OTHER FATIGUE: ICD-10-CM

## 2025-07-15 DIAGNOSIS — R10.13 EPIGASTRIC PAIN: ICD-10-CM

## 2025-07-15 DIAGNOSIS — R53.83 OTHER FATIGUE: Primary | ICD-10-CM

## 2025-07-15 PROCEDURE — S9083 URGENT CARE CENTER GLOBAL: HCPCS | Performed by: PHYSICIAN ASSISTANT

## 2025-07-15 PROCEDURE — G0382 LEV 3 HOSP TYPE B ED VISIT: HCPCS | Performed by: PHYSICIAN ASSISTANT

## 2025-07-15 PROCEDURE — 86618 LYME DISEASE ANTIBODY: CPT

## 2025-07-15 PROCEDURE — 36415 COLL VENOUS BLD VENIPUNCTURE: CPT

## 2025-07-15 RX ORDER — PANTOPRAZOLE SODIUM 40 MG/1
40 TABLET, DELAYED RELEASE ORAL DAILY
Qty: 30 TABLET | Refills: 0 | Status: SHIPPED | OUTPATIENT
Start: 2025-07-15

## 2025-07-16 LAB — B BURGDOR IGG+IGM SER QL IA: NEGATIVE

## 2025-07-17 ENCOUNTER — OFFICE VISIT (OUTPATIENT)
Age: 35
End: 2025-07-17
Payer: COMMERCIAL

## 2025-07-17 VITALS
OXYGEN SATURATION: 98 % | BODY MASS INDEX: 26.6 KG/M2 | DIASTOLIC BLOOD PRESSURE: 80 MMHG | WEIGHT: 179.6 LBS | HEART RATE: 101 BPM | TEMPERATURE: 97.5 F | RESPIRATION RATE: 18 BRPM | HEIGHT: 69 IN | SYSTOLIC BLOOD PRESSURE: 102 MMHG

## 2025-07-17 DIAGNOSIS — Z82.49 FAMILY HISTORY OF BRAIN ANEURYSM: ICD-10-CM

## 2025-07-17 DIAGNOSIS — Z13.228 SCREENING FOR METABOLIC DISORDER: ICD-10-CM

## 2025-07-17 DIAGNOSIS — R19.5 LOOSE STOOLS: ICD-10-CM

## 2025-07-17 DIAGNOSIS — R63.0 LOSS OF APPETITE: ICD-10-CM

## 2025-07-17 DIAGNOSIS — Z00.00 ANNUAL PHYSICAL EXAM: Primary | ICD-10-CM

## 2025-07-17 DIAGNOSIS — R10.10 PAIN OF UPPER ABDOMEN: ICD-10-CM

## 2025-07-17 DIAGNOSIS — R52 GENERALIZED BODY ACHES: ICD-10-CM

## 2025-07-17 DIAGNOSIS — R53.83 OTHER FATIGUE: ICD-10-CM

## 2025-07-17 PROCEDURE — 99385 PREV VISIT NEW AGE 18-39: CPT | Performed by: STUDENT IN AN ORGANIZED HEALTH CARE EDUCATION/TRAINING PROGRAM

## 2025-07-17 PROCEDURE — 99213 OFFICE O/P EST LOW 20 MIN: CPT | Performed by: STUDENT IN AN ORGANIZED HEALTH CARE EDUCATION/TRAINING PROGRAM

## 2025-07-17 NOTE — PATIENT INSTRUCTIONS
"Patient Education     Routine physical for adults   The Basics   Written by the doctors and editors at [facility]   What is a physical? -- A physical is a routine visit, or \"check-up,\" with your doctor. You might also hear it called a \"wellness visit\" or \"preventive visit.\"  During each visit, the doctor will:   Ask about your physical and mental health   Ask about your habits, behaviors, and lifestyle   Do an exam   Give you vaccines if needed   Talk to you about any medicines you take   Give advice about your health   Answer your questions  Getting regular check-ups is an important part of taking care of your health. It can help your doctor find and treat any problems you have. But it's also important for preventing health problems.  A routine physical is different from a \"sick visit.\" A sick visit is when you see a doctor because of a health concern or problem. Since physicals are scheduled ahead of time, you can think about what you want to ask the doctor.  How often should I get a physical? -- It depends on your age and health. In general, for people age 21 years and older:   If you are younger than 50 years, you might be able to get a physical every 3 years.   If you are 50 years or older, your doctor might recommend a physical every year.  If you have an ongoing health condition, like diabetes or high blood pressure, your doctor will probably want to see you more often.  What happens during a physical? -- In general, each visit will include:   Physical exam - The doctor or nurse will check your height, weight, heart rate, and blood pressure. They will also look at your eyes and ears. They will ask about how you are feeling and whether you have any symptoms that bother you.   Medicines - It's a good idea to bring a list of all the medicines you take to each doctor visit. Your doctor will talk to you about your medicines and answer any questions. Tell them if you are having any side effects that bother you. You " "should also tell them if you are having trouble paying for any of your medicines.   Habits and behaviors - This includes:   Your diet   Your exercise habits   Whether you smoke, drink alcohol, or use drugs   Whether you are sexually active   Whether you feel safe at home  Your doctor will talk to you about things you can do to improve your health and lower your risk of health problems. They will also offer help and support. For example, if you want to quit smoking, they can give you advice and might prescribe medicines. If you want to improve your diet or get more physical activity, they can help you with this, too.   Lab tests, if needed - The tests you get will depend on your age and situation. For example, your doctor might want to check your:   Cholesterol   Blood sugar   Iron level   Vaccines - The recommended vaccines will depend on your age, health, and what vaccines you already had. Vaccines are very important because they can prevent certain serious or deadly infections.   Discussion of screening - \"Screening\" means checking for diseases or other health problems before they cause symptoms. Your doctor can recommend screening based on your age, risk, and preferences. This might include tests to check for:   Cancer, such as breast, prostate, cervical, ovarian, colorectal, prostate, lung, or skin cancer   Sexually transmitted infections, such as chlamydia and gonorrhea   Mental health conditions like depression and anxiety  Your doctor will talk to you about the different types of screening tests. They can help you decide which screenings to have. They can also explain what the results might mean.   Answering questions - The physical is a good time to ask the doctor or nurse questions about your health. If needed, they can refer you to other doctors or specialists, too.  Adults older than 65 years often need other care, too. As you get older, your doctor will talk to you about:   How to prevent falling at " home   Hearing or vision tests   Memory testing   How to take your medicines safely   Making sure that you have the help and support you need at home  All topics are updated as new evidence becomes available and our peer review process is complete.  This topic retrieved from [employer] on: May 02, 2024.  Topic 028749 Version 1.0  Release: 32.4.3 - C32.122  © 2024 UpToDate, Inc. and/or its affiliates. All rights reserved.  Consumer Information Use and Disclaimer   Disclaimer: This generalized information is a limited summary of diagnosis, treatment, and/or medication information. It is not meant to be comprehensive and should be used as a tool to help the user understand and/or assess potential diagnostic and treatment options. It does NOT include all information about conditions, treatments, medications, side effects, or risks that may apply to a specific patient. It is not intended to be medical advice or a substitute for the medical advice, diagnosis, or treatment of a health care provider based on the health care provider's examination and assessment of a patient's specific and unique circumstances. Patients must speak with a health care provider for complete information about their health, medical questions, and treatment options, including any risks or benefits regarding use of medications. This information does not endorse any treatments or medications as safe, effective, or approved for treating a specific patient. UpToDate, Inc. and its affiliates disclaim any warranty or liability relating to this information or the use thereof.The use of this information is governed by the Terms of Use, available at https://www.woltersJackPot Rewardsuwer.com/en/know/clinical-effectiveness-terms. 2024© UpToDate, Inc. and its affiliates and/or licensors. All rights reserved.  Copyright   © 2024 UpToDate, Inc. and/or its affiliates. All rights reserved.

## 2025-07-17 NOTE — PROGRESS NOTES
Adult Annual Physical  Name: Keith Andino      : 1990      MRN: [id number]  Encounter Provider: Mitesh Gibson MD  Encounter Date: 2025   Encounter department: [facility]    :  Assessment & Plan  Annual physical exam  Immunizations: Counseled on PCV20 and Tdap vaccines  Labs: CBC, CMP, lipids, TSH, testosterone, vit D, fecal fabi protectin, celiac panel, iron panel, stool O&P, stool enteric panel  Screening: Not yet due for cancer screening        Pain of upper abdomen  Loss of appetite  Loose stools  Given his constellation of symptoms including poor appetite, nausea, constipation and loose stools, and abdominal pain that can be worse with stress or specifically eating out at restaurants, clinical picture is concerning for IBS mixed pattern.  Given the duration of his symptoms, infectious etiology is lower on the differential, although H pylori is a consideration.  Patient does not seem to have any particular foods sensitivities but does state that he tends to have symptoms when he eats out at restaurants.  Will try to rule out celiac disease.  He does have tenderness in the right upper quadrant and epigastric region on exam so will pursue imaging but with low invasiveness. Other considerations include GERD, Crohn's, functional GI disorder.  Obtain stool studies including H pylori, stool enteric panel, O&P, fecal calprotectin  Check celiac panel  Obtain US abdomen  Refer to GI  Continue PPI for now      Orders:    Ambulatory Referral to Family Practice    Ambulatory Referral to Gastroenterology; Future    Celiac Panel/Adult; Future    Calprotectin,Fecal; Future    US abdomen complete; Future    Ova and parasite examination; Future    Stool Enteric Bacterial Panel by PCR; Future    H. pylori antigen, stool; Future    Other fatigue  Generalized body aches  Based on patient's history, concern that he has some vitamin/mineral deficiency in setting of poor PO intake. Will also  check for anemia, thyroid disease. Patient also would like to check testosterone levels.  Check CBC, CMP, TSH, vit D, iron panel, testosterone    Orders:    Ambulatory Referral to Family Practice    CBC and differential; Future    Comprehensive metabolic panel; Future    TSH, 3rd generation with Free T4 reflex; Future    Vitamin D 25 hydroxy; Future    Iron Panel (Includes Ferritin, Iron Sat%, Iron, and TIBC); Future    Testosterone, free, total; Future    Family history of brain aneurysm  Mother, maternal grandmother, and maternal aunt with history of brain aneurysms. Will refer to Neurosurgery for further guidance on screening in patient with significant family history.    Orders:    Ambulatory Referral to Neurosurgery; Future    Screening for metabolic disorder    Orders:    Lipid Panel with Direct LDL reflex; Future          Preventive Screenings:  - Diabetes Screening: orders placed  - Cholesterol Screening: orders placed   - Hepatitis C screening: screening up-to-date   - HIV screening: screening up-to-date   - Colon cancer screening: screening not indicated   - Lung cancer screening: screening not indicated   - Prostate cancer screening: screening not indicated     Immunizations:  - Immunizations due: Prevnar 20 and Tdap  - Risks/benefits immunizations discussed      Counseling/Anticipatory Guidance:    - Dental health: discussed importance of regular tooth brushing, flossing, and dental visits.       Depression Screening and Follow-up Plan: Patient was screened for depression during today's encounter. They screened negative with a PHQ-2 score of 0.      Tobacco Cessation Counseling: Tobacco cessation counseling was provided. The patient is sincerely urged to quit consumption of tobacco. He is not ready to quit tobacco. Medication options discussed.         History of Present Illness     Adult Annual Physical:  Patient presents for annual physical. Keith Andino is a 34 yo M with PMH of back surgeries for  herniated disk who presents today for annual physical.  He was recently seen at urgent care for nausea, poor appetite, weakness, and bodyaches.  He was treated with PPI and advised to follow-up with PCP.  Patient reports that since his most recent back surgery 2 years ago he has had issues with chronic nausea, poor appetite, early satiety.  He reports that he can go many days with poor p.o. intake and then some days he will eat very large amounts.  His weight will fluctuate due to this.  He denies any particular foods causing his symptoms but does note that when he goes out to eat at restaurants, once he gets home he will need to have an urgent bowel movement.  He reports that he can go 3 to 4 days without a bowel movement but when he does have a bowel movement he can have multiple soft/loose bowel movements.  Occasionally he will get a right upper quadrant stabbing pain that he feels like is a knife coming from the inside.  He does report that his symptoms can get worse with increased stress.  More recently has been developing increased fatigue and low energy..     Diet and Physical Activity:  - Diet/Nutrition:. Goes through periods of not eating very well, mostly carnivore diet  - Exercise:. Physical occupation - construction    Depression Screening:  - PHQ-2 Score: 0    General Health:  - Sleep: sleeps well.  - Hearing: decreased hearing bilateral ears.  - Vision: most recent eye exam < 1 year ago and wears glasses.  - Dental: no dental visits for > 1 year and does not brush teeth regularly.     Health:  - History of STDs: no.   - Urinary symptoms: none.     Advanced Care Planning:  - Has an advanced directive?: no    - Has a durable medical POA?: no    - ACP document given to patient?: no      Review of Systems   Constitutional:  Positive for appetite change and fatigue. Negative for chills, fever and unexpected weight change.   HENT:  Negative for congestion and sore throat.    Eyes:  Negative for visual  "disturbance.   Respiratory:  Negative for cough and shortness of breath.    Cardiovascular:  Negative for chest pain and leg swelling.   Gastrointestinal:  Positive for abdominal pain, constipation, diarrhea and nausea. Negative for blood in stool and vomiting.   Genitourinary:  Negative for difficulty urinating and dysuria.   Musculoskeletal:  Negative for arthralgias and myalgias.   Skin:  Negative for rash.   Neurological:  Positive for weakness. Negative for dizziness, light-headedness and headaches.   Psychiatric/Behavioral:  Negative for confusion.      Medical History Reviewed by provider this encounter:  Meds  Fam Hx     .  Past Medical History   Past Medical History[1]  Past Surgical History[2]  Family History[3]   reports that he has been smoking cigarettes. He has a 10 pack-year smoking history. He quit smokeless tobacco use about 14 years ago.  His smokeless tobacco use included chew and snuff. He reports current drug use. Drugs: Marijuana and Other. He reports that he does not drink alcohol.  Current Outpatient Medications   Medication Instructions    pantoprazole (PROTONIX) 40 mg, Oral, Daily   Allergies[4]   Medications Ordered Prior to Encounter[5]   Social History[6]    Objective   /80 (BP Location: Left arm, Patient Position: Sitting, Cuff Size: Standard)   Pulse 101 Comment: taken manually it was going up and down reaching 120 and then going down to 94 and everything in bwtween on the pluse ox  Temp 97.5 °F (36.4 °C) (Tympanic)   Resp 18   Ht 5' 9\" (1.753 m)   Wt 81.5 kg (179 lb 9.6 oz)   SpO2 98%   BMI 26.52 kg/m²     Physical Exam  Constitutional:       General: He is not in acute distress.  HENT:      Right Ear: Tympanic membrane, ear canal and external ear normal.      Left Ear: Tympanic membrane, ear canal and external ear normal.      Nose: Nose normal.      Mouth/Throat:      Mouth: Mucous membranes are moist.      Pharynx: Oropharynx is clear.     Eyes:      " Conjunctiva/sclera: Conjunctivae normal.      Pupils: Pupils are equal, round, and reactive to light.     Neck:      Thyroid: No thyroid mass or thyroid tenderness.     Cardiovascular:      Rate and Rhythm: Normal rate and regular rhythm.      Heart sounds: Normal heart sounds.   Pulmonary:      Effort: Pulmonary effort is normal.      Breath sounds: Normal breath sounds.   Abdominal:      General: There is no distension.      Tenderness: There is abdominal tenderness in the right upper quadrant and epigastric area.     Musculoskeletal:      Cervical back: Neck supple.      Right lower leg: No edema.      Left lower leg: No edema.     Skin:     General: Skin is warm and dry.     Neurological:      Mental Status: He is alert.      Sensory: Sensation is intact.      Motor: Motor function is intact.     Psychiatric:         Mood and Affect: Mood normal.         Speech: Speech normal.         Behavior: Behavior normal. Behavior is cooperative.              [1] No past medical history on file.  [2]   Past Surgical History:  Procedure Laterality Date    BACK SURGERY  04/2018    HAND SURGERY      SPINE SURGERY  2018/2023   [3]   Family History  Problem Relation Name Age of Onset    Brain Aneurysm Mother      Prostate cancer Father      Coronary artery disease Father      Atrial fibrillation Brother      Brain Aneurysm Maternal Grandmother      Brain Aneurysm Maternal Aunt     [4] No Known Allergies  [5]   Current Outpatient Medications on File Prior to Visit   Medication Sig Dispense Refill    pantoprazole (PROTONIX) 40 mg tablet Take 1 tablet (40 mg total) by mouth daily 30 tablet 0    [DISCONTINUED] celecoxib (CeleBREX) 200 mg capsule Take 1 capsule (200 mg total) by mouth every 12 (twelve) hours (Patient not taking: Reported on 12/13/2024) 60 capsule 0    [DISCONTINUED] ketoconazole (NIZORAL) 2 % cream Apply topically daily (Patient not taking: Reported on 12/13/2024) 30 g 0    [DISCONTINUED] methylPREDNISolone 4 MG  tablet therapy pack Use as directed on package (Patient not taking: Reported on 12/13/2024) 21 tablet 0    [DISCONTINUED] morphine (MSIR) 15 mg tablet Take 1 tablet (15 mg total) by mouth every 6 (six) hours as needed for severe pain Max Daily Amount: 60 mg (Patient not taking: Reported on 12/13/2024) 10 tablet 0    [DISCONTINUED] ofloxacin (OCUFLOX) 0.3 % ophthalmic solution Administer 1 drop to the right eye 4 (four) times a day (Patient not taking: Reported on 7/15/2025) 5 mL 0    [DISCONTINUED] pregabalin (LYRICA) 75 mg capsule Take 75 mg by mouth 3 (three) times a day (Patient not taking: Reported on 12/13/2024)       No current facility-administered medications on file prior to visit.   [6]   Social History  Tobacco Use    Smoking status: Every Day     Current packs/day: 0.50     Average packs/day: 0.5 packs/day for 20.0 years (10.0 ttl pk-yrs)     Types: Cigarettes    Smokeless tobacco: Former     Types: Chew, Snuff     Quit date: 3/1/2011    Tobacco comments:     I vape now   Vaping Use    Vaping status: Every Day    Substances: Nicotine, THC, Flavoring   Substance and Sexual Activity    Alcohol use: Never    Drug use: Yes     Types: Marijuana, Other     Comment: medical marijuana ahs the licensed    Sexual activity: Yes     Partners: Female     Birth control/protection: None

## 2025-07-19 ENCOUNTER — APPOINTMENT (OUTPATIENT)
Age: 35
End: 2025-07-19
Payer: COMMERCIAL

## 2025-07-19 ENCOUNTER — HOSPITAL ENCOUNTER (OUTPATIENT)
Dept: ULTRASOUND IMAGING | Facility: HOSPITAL | Age: 35
Discharge: HOME | End: 2025-07-19
Attending: STUDENT IN AN ORGANIZED HEALTH CARE EDUCATION/TRAINING PROGRAM
Payer: COMMERCIAL

## 2025-07-19 DIAGNOSIS — R53.83 OTHER FATIGUE: ICD-10-CM

## 2025-07-19 DIAGNOSIS — R63.0 LOSS OF APPETITE: ICD-10-CM

## 2025-07-19 DIAGNOSIS — R19.5 LOOSE STOOLS: ICD-10-CM

## 2025-07-19 DIAGNOSIS — Z13.228 SCREENING FOR METABOLIC DISORDER: ICD-10-CM

## 2025-07-19 DIAGNOSIS — R10.10 PAIN OF UPPER ABDOMEN: ICD-10-CM

## 2025-07-19 LAB
ALBUMIN SERPL BCG-MCNC: 5.1 G/DL (ref 3.5–5)
ALP SERPL-CCNC: 84 U/L (ref 34–104)
ALT SERPL W P-5'-P-CCNC: 43 U/L (ref 7–52)
ANION GAP SERPL CALCULATED.3IONS-SCNC: 11 MMOL/L (ref 4–13)
AST SERPL W P-5'-P-CCNC: 32 U/L (ref 13–39)
BASOPHILS # BLD AUTO: 0.05 THOUSANDS/ÂΜL (ref 0–0.1)
BASOPHILS NFR BLD AUTO: 1 % (ref 0–1)
BILIRUB SERPL-MCNC: 0.46 MG/DL (ref 0.2–1)
BUN SERPL-MCNC: 26 MG/DL (ref 5–25)
CALCIUM SERPL-MCNC: 10 MG/DL (ref 8.4–10.2)
CHLORIDE SERPL-SCNC: 99 MMOL/L (ref 96–108)
CO2 SERPL-SCNC: 28 MMOL/L (ref 21–32)
CREAT SERPL-MCNC: 1.09 MG/DL (ref 0.6–1.3)
EOSINOPHIL # BLD AUTO: 0.16 THOUSAND/ÂΜL (ref 0–0.61)
EOSINOPHIL NFR BLD AUTO: 2 % (ref 0–6)
ERYTHROCYTE [DISTWIDTH] IN BLOOD BY AUTOMATED COUNT: 12.9 % (ref 11.6–15.1)
FERRITIN SERPL-MCNC: 69 NG/ML (ref 30–336)
GFR SERPL CREATININE-BSD FRML MDRD: 88 ML/MIN/1.73SQ M
GLUCOSE P FAST SERPL-MCNC: 111 MG/DL (ref 65–99)
HCT VFR BLD AUTO: 48 % (ref 36.5–49.3)
HGB BLD-MCNC: 16.1 G/DL (ref 12–17)
IGA SERPL-MCNC: 284 MG/DL (ref 66–433)
IMM GRANULOCYTES # BLD AUTO: 0.05 THOUSAND/UL (ref 0–0.2)
IMM GRANULOCYTES NFR BLD AUTO: 1 % (ref 0–2)
IRON SATN MFR SERPL: 20 % (ref 15–50)
IRON SERPL-MCNC: 87 UG/DL (ref 50–212)
LYMPHOCYTES # BLD AUTO: 2.5 THOUSANDS/ÂΜL (ref 0.6–4.47)
LYMPHOCYTES NFR BLD AUTO: 36 % (ref 14–44)
MCH RBC QN AUTO: 28.8 PG (ref 26.8–34.3)
MCHC RBC AUTO-ENTMCNC: 33.5 G/DL (ref 31.4–37.4)
MCV RBC AUTO: 86 FL (ref 82–98)
MONOCYTES # BLD AUTO: 0.62 THOUSAND/ÂΜL (ref 0.17–1.22)
MONOCYTES NFR BLD AUTO: 9 % (ref 4–12)
NEUTROPHILS # BLD AUTO: 3.62 THOUSANDS/ÂΜL (ref 1.85–7.62)
NEUTS SEG NFR BLD AUTO: 51 % (ref 43–75)
NRBC BLD AUTO-RTO: 0 /100 WBCS
PLATELET # BLD AUTO: 292 THOUSANDS/UL (ref 149–390)
PMV BLD AUTO: 9.4 FL (ref 8.9–12.7)
POTASSIUM SERPL-SCNC: 4.9 MMOL/L (ref 3.5–5.3)
PROT SERPL-MCNC: 8.4 G/DL (ref 6.4–8.4)
RBC # BLD AUTO: 5.59 MILLION/UL (ref 3.88–5.62)
SODIUM SERPL-SCNC: 138 MMOL/L (ref 135–147)
TIBC SERPL-MCNC: 432.6 UG/DL (ref 250–450)
TRANSFERRIN SERPL-MCNC: 309 MG/DL (ref 203–362)
TSH SERPL DL<=0.05 MIU/L-ACNC: 0.78 UIU/ML (ref 0.45–4.5)
UIBC SERPL-MCNC: 346 UG/DL (ref 155–355)
WBC # BLD AUTO: 7 THOUSAND/UL (ref 4.31–10.16)

## 2025-07-19 PROCEDURE — 82306 VITAMIN D 25 HYDROXY: CPT

## 2025-07-19 PROCEDURE — 84403 ASSAY OF TOTAL TESTOSTERONE: CPT

## 2025-07-19 PROCEDURE — 86364 TISS TRNSGLTMNASE EA IG CLAS: CPT

## 2025-07-19 PROCEDURE — 82728 ASSAY OF FERRITIN: CPT

## 2025-07-19 PROCEDURE — 84443 ASSAY THYROID STIM HORMONE: CPT

## 2025-07-19 PROCEDURE — 83550 IRON BINDING TEST: CPT

## 2025-07-19 PROCEDURE — 84402 ASSAY OF FREE TESTOSTERONE: CPT

## 2025-07-19 PROCEDURE — 36415 COLL VENOUS BLD VENIPUNCTURE: CPT

## 2025-07-19 PROCEDURE — 82784 ASSAY IGA/IGD/IGG/IGM EACH: CPT

## 2025-07-19 PROCEDURE — 80053 COMPREHEN METABOLIC PANEL: CPT

## 2025-07-19 PROCEDURE — 85025 COMPLETE CBC W/AUTO DIFF WBC: CPT

## 2025-07-19 PROCEDURE — 76700 US EXAM ABDOM COMPLETE: CPT

## 2025-07-19 PROCEDURE — 83540 ASSAY OF IRON: CPT

## 2025-07-20 LAB — 25(OH)D3 SERPL-MCNC: 26.2 NG/ML (ref 30–100)

## 2025-07-21 ENCOUNTER — APPOINTMENT (OUTPATIENT)
Age: 35
End: 2025-07-21
Payer: COMMERCIAL

## 2025-07-21 LAB
TESTOST FREE SERPL-MCNC: 11 PG/ML (ref 8.7–25.1)
TESTOST SERPL-MCNC: 515 NG/DL (ref 264–916)

## 2025-07-21 PROCEDURE — 87177 OVA AND PARASITES SMEARS: CPT

## 2025-07-21 PROCEDURE — 83993 ASSAY FOR CALPROTECTIN FECAL: CPT

## 2025-07-21 PROCEDURE — 87209 SMEAR COMPLEX STAIN: CPT

## 2025-07-21 PROCEDURE — 87338 HPYLORI STOOL AG IA: CPT

## 2025-07-21 PROCEDURE — 87505 NFCT AGENT DETECTION GI: CPT

## 2025-07-22 LAB
C COLI+JEJUNI TUF STL QL NAA+PROBE: NEGATIVE
EC STX1+STX2 GENES STL QL NAA+PROBE: NEGATIVE
SALMONELLA SP SPAO STL QL NAA+PROBE: NEGATIVE
SHIGELLA SP+EIEC IPAH STL QL NAA+PROBE: NEGATIVE

## 2025-07-23 LAB
CALPROTECTIN STL-MCNC: 689 ÂΜG/G
H PYLORI AG STL QL IA: NEGATIVE

## 2025-07-26 LAB — TTG IGA SER IA-ACNC: 0.5 U/ML (ref ?–10)

## 2025-07-28 DIAGNOSIS — Z13.228 SCREENING FOR METABOLIC DISORDER: Primary | ICD-10-CM

## 2025-08-07 ENCOUNTER — CONSULT (OUTPATIENT)
Dept: NEUROSURGERY | Facility: CLINIC | Age: 35
End: 2025-08-07
Attending: STUDENT IN AN ORGANIZED HEALTH CARE EDUCATION/TRAINING PROGRAM
Payer: COMMERCIAL

## 2025-08-07 VITALS
BODY MASS INDEX: 26.51 KG/M2 | OXYGEN SATURATION: 98 % | WEIGHT: 179 LBS | TEMPERATURE: 98.3 F | DIASTOLIC BLOOD PRESSURE: 75 MMHG | SYSTOLIC BLOOD PRESSURE: 108 MMHG | HEIGHT: 69 IN | HEART RATE: 81 BPM

## 2025-08-07 DIAGNOSIS — Z82.49 FAMILY HISTORY OF BRAIN ANEURYSM: ICD-10-CM

## 2025-08-07 PROCEDURE — 99203 OFFICE O/P NEW LOW 30 MIN: CPT | Performed by: PHYSICIAN ASSISTANT

## 2025-08-13 ENCOUNTER — CONSULT (OUTPATIENT)
Dept: GASTROENTEROLOGY | Facility: CLINIC | Age: 35
End: 2025-08-13
Attending: STUDENT IN AN ORGANIZED HEALTH CARE EDUCATION/TRAINING PROGRAM
Payer: COMMERCIAL